# Patient Record
Sex: MALE | URBAN - METROPOLITAN AREA
[De-identification: names, ages, dates, MRNs, and addresses within clinical notes are randomized per-mention and may not be internally consistent; named-entity substitution may affect disease eponyms.]

---

## 2020-06-01 ENCOUNTER — OFFICE VISIT (OUTPATIENT)
Dept: URBAN - METROPOLITAN AREA TELEHEALTH 2 | Facility: TELEHEALTH | Age: 45
End: 2020-06-01
Payer: COMMERCIAL

## 2020-06-01 DIAGNOSIS — Z21 HIV POSITIVE: ICD-10-CM

## 2020-06-01 DIAGNOSIS — Z86.19 HISTORY OF HEPATITIS C VIRUS INFECTION: ICD-10-CM

## 2020-06-01 PROCEDURE — G8785 BP SCRN NO PERF AT INTERVAL: HCPCS | Performed by: INTERNAL MEDICINE

## 2020-06-01 PROCEDURE — G8428 CUR MEDS NOT DOCUMENT: HCPCS | Performed by: INTERNAL MEDICINE

## 2020-06-01 PROCEDURE — 99213 OFFICE O/P EST LOW 20 MIN: CPT | Performed by: INTERNAL MEDICINE

## 2020-06-01 PROCEDURE — G9903 PT SCRN TBCO ID AS NON USER: HCPCS | Performed by: INTERNAL MEDICINE

## 2020-06-01 PROCEDURE — G8417 CALC BMI ABV UP PARAM F/U: HCPCS | Performed by: INTERNAL MEDICINE

## 2020-06-01 PROCEDURE — 1036F TOBACCO NON-USER: CPT | Performed by: INTERNAL MEDICINE

## 2020-06-01 RX ORDER — BUPROPION HYDROCHLORIDE 300 MG/1
TAKE 1 TABLET (300 MG) BY ORAL ROUTE ONCE DAILY TABLET, EXTENDED RELEASE ORAL 1
Qty: 0 | Refills: 0 | Status: ACTIVE | COMMUNITY
Start: 1900-01-01

## 2020-06-01 RX ORDER — DIAZEPAM 5 MG/1
TABLET ORAL
Qty: 0 | Refills: 0 | Status: ACTIVE | COMMUNITY
Start: 1900-01-01

## 2020-06-01 RX ORDER — MIRTAZAPINE 7.5 MG/1
TABLET ORAL
Qty: 0 | Refills: 0 | Status: ACTIVE | COMMUNITY
Start: 1900-01-01

## 2020-06-01 RX ORDER — BICTEGRAVIR SODIUM, EMTRICITABINE, AND TENOFOVIR ALAFENAMIDE FUMARATE 50; 200; 25 MG/1; MG/1; MG/1
TAKE 1 TABLET BY ORAL ROUTE ONCE DAILY TABLET ORAL 1
Qty: 0 | Refills: 0 | Status: ACTIVE | COMMUNITY
Start: 1900-01-01

## 2020-06-01 RX ORDER — SILDENAFIL 100 MG/1
TAKE 1 TABLET (100 MG) BY ORAL ROUTE ONCE DAILY AS NEEDED APPROXIMATELY 1 HOUR BEFORE SEXUAL ACTIVITY TABLET, FILM COATED ORAL 1
Qty: 0 | Refills: 0 | Status: ACTIVE | COMMUNITY
Start: 1900-01-01

## 2020-06-01 NOTE — HPI-TODAY'S VISIT:
The patient returns to the office today by a virtual appointment in follow-up.  The patient completed 8 weeks of mavyret for genotype 1 chronic hepatitis-C with a starting viral load of approximately 1.4 million.  The patient's last dose will be tomorrow.  They are will be no missed doses. He had no side affects.

## 2020-06-12 ENCOUNTER — LAB OUTSIDE AN ENCOUNTER (OUTPATIENT)
Dept: URBAN - METROPOLITAN AREA CLINIC 105 | Facility: CLINIC | Age: 45
End: 2020-06-12

## 2020-06-16 LAB
A/G RATIO: 1.9
ALBUMIN: 4.4
ALKALINE PHOSPHATASE: 98
ALT (SGPT): 19
AST (SGOT): 26
BASO (ABSOLUTE): 0
BASOS: 0
BILIRUBIN, TOTAL: 0.6
BUN/CREATININE RATIO: 8
BUN: 12
CALCIUM: 9.2
CARBON DIOXIDE, TOTAL: 23
CHLORIDE: 100
CREATININE: 1.59
EGFR IF AFRICN AM: 60
EGFR IF NONAFRICN AM: 52
EOS (ABSOLUTE): 0.2
EOS: 4
GLOBULIN, TOTAL: 2.3
GLUCOSE: 103
HCV LOG10: (no result)
HEMATOCRIT: 43.1
HEMATOLOGY COMMENTS:: (no result)
HEMOGLOBIN: 14.5
HEPATITIS C QUANTITATION: (no result)
IMMATURE CELLS: (no result)
IMMATURE GRANS (ABS): 0.1
IMMATURE GRANULOCYTES: 1
LYMPHS (ABSOLUTE): 2.2
LYMPHS: 33
MCH: 32.6
MCHC: 33.6
MCV: 97
MONOCYTES(ABSOLUTE): 0.8
MONOCYTES: 12
NEUTROPHILS (ABSOLUTE): 3.3
NEUTROPHILS: 50
NRBC: (no result)
PLATELETS: 207
POTASSIUM: 4.1
PROTEIN, TOTAL: 6.7
RBC: 4.45
RDW: 13.5
SODIUM: 138
TEST INFORMATION:: (no result)
WBC: 6.6

## 2020-08-14 ENCOUNTER — OFFICE VISIT (OUTPATIENT)
Dept: URBAN - METROPOLITAN AREA CLINIC 105 | Facility: CLINIC | Age: 45
End: 2020-08-14
Payer: COMMERCIAL

## 2020-08-14 ENCOUNTER — LAB OUTSIDE AN ENCOUNTER (OUTPATIENT)
Dept: URBAN - METROPOLITAN AREA CLINIC 105 | Facility: CLINIC | Age: 45
End: 2020-08-14

## 2020-08-14 ENCOUNTER — DASHBOARD ENCOUNTERS (OUTPATIENT)
Age: 45
End: 2020-08-14

## 2020-08-14 DIAGNOSIS — F32.89 OTHER DEPRESSION: ICD-10-CM

## 2020-08-14 DIAGNOSIS — R79.89 CREATININE ELEVATION: ICD-10-CM

## 2020-08-14 DIAGNOSIS — B18.2 CHRONIC HEPATITIS C WITHOUT HEPATIC COMA: ICD-10-CM

## 2020-08-14 DIAGNOSIS — Z21 ASYMPTOMATIC HIV INFECTION: ICD-10-CM

## 2020-08-14 PROBLEM — 91947003: Status: ACTIVE | Noted: 2020-08-14

## 2020-08-14 PROBLEM — 35489007: Status: ACTIVE | Noted: 2020-08-14

## 2020-08-14 PROBLEM — 128302006: Status: ACTIVE | Noted: 2020-08-14

## 2020-08-14 PROBLEM — 365756002: Status: ACTIVE | Noted: 2020-08-14

## 2020-08-14 PROCEDURE — 1036F TOBACCO NON-USER: CPT | Performed by: INTERNAL MEDICINE

## 2020-08-14 PROCEDURE — G8427 DOCREV CUR MEDS BY ELIG CLIN: HCPCS | Performed by: INTERNAL MEDICINE

## 2020-08-14 PROCEDURE — 99213 OFFICE O/P EST LOW 20 MIN: CPT | Performed by: INTERNAL MEDICINE

## 2020-08-14 PROCEDURE — G8417 CALC BMI ABV UP PARAM F/U: HCPCS | Performed by: INTERNAL MEDICINE

## 2020-08-14 RX ORDER — DIAZEPAM 5 MG/1
TABLET ORAL
Qty: 0 | Refills: 0 | Status: ACTIVE | COMMUNITY
Start: 1900-01-01

## 2020-08-14 RX ORDER — BICTEGRAVIR SODIUM, EMTRICITABINE, AND TENOFOVIR ALAFENAMIDE FUMARATE 50; 200; 25 MG/1; MG/1; MG/1
TAKE 1 TABLET BY ORAL ROUTE ONCE DAILY TABLET ORAL 1
Qty: 0 | Refills: 0 | Status: ACTIVE | COMMUNITY
Start: 1900-01-01

## 2020-08-14 RX ORDER — SILDENAFIL 100 MG/1
TAKE 1 TABLET (100 MG) BY ORAL ROUTE ONCE DAILY AS NEEDED APPROXIMATELY 1 HOUR BEFORE SEXUAL ACTIVITY TABLET, FILM COATED ORAL 1
Qty: 0 | Refills: 0 | Status: ACTIVE | COMMUNITY
Start: 1900-01-01

## 2020-08-14 RX ORDER — MIRTAZAPINE 7.5 MG/1
TABLET ORAL
Qty: 0 | Refills: 0 | Status: ACTIVE | COMMUNITY
Start: 1900-01-01

## 2020-08-14 RX ORDER — BUPROPION HYDROCHLORIDE 300 MG/1
TAKE 1 TABLET (300 MG) BY ORAL ROUTE ONCE DAILY TABLET, EXTENDED RELEASE ORAL 1
Qty: 0 | Refills: 0 | Status: ACTIVE | COMMUNITY
Start: 1900-01-01

## 2020-08-14 NOTE — HPI-OTHER HISTORIES
46 yo male pt of DR Lu. I am seeing him as Dr Reyes is not in the office. He completed an 8 week course of Mavyret to treat chronic hep C in 6/3/20,  Labs drawn at end  of treatment showed undetectable VL and nl LFTs.  I have told him confirmation of cure would occur with labs 6 months after end of treatment which would be in 12/2020 to confirm SVR. All questions answered.  He has no complaints today.

## 2020-08-15 LAB
ALBUMIN: 4.6
ALKALINE PHOSPHATASE: 72
ALT (SGPT): 21
AST (SGOT): 36
BILIRUBIN, DIRECT: 0.17
BILIRUBIN, TOTAL: 0.6
HEP C VIRUS AB: >11
PROTEIN, TOTAL: 6.9

## 2020-08-19 LAB
Lab: (no result)
Lab: (no result)

## 2020-08-25 LAB
HCV GENOTYPE: (no result)
HCV LOG10: (no result)
HEPATITIS C QUANTITATION: (no result)
TEST INFORMATION:: (no result)
WRITTEN AUTHORIZATION: (no result)

## 2022-06-07 ENCOUNTER — WEB ENCOUNTER (OUTPATIENT)
Dept: URBAN - METROPOLITAN AREA SURGERY CENTER 16 | Facility: SURGERY CENTER | Age: 47
End: 2022-06-07

## 2022-06-09 ENCOUNTER — WEB ENCOUNTER (OUTPATIENT)
Dept: URBAN - METROPOLITAN AREA SURGERY CENTER 16 | Facility: SURGERY CENTER | Age: 47
End: 2022-06-09

## 2022-06-13 ENCOUNTER — CLAIMS CREATED FROM THE CLAIM WINDOW (OUTPATIENT)
Dept: URBAN - METROPOLITAN AREA CLINIC 4 | Facility: CLINIC | Age: 47
End: 2022-06-13
Payer: COMMERCIAL

## 2022-06-13 ENCOUNTER — OFFICE VISIT (OUTPATIENT)
Dept: URBAN - METROPOLITAN AREA SURGERY CENTER 16 | Facility: SURGERY CENTER | Age: 47
End: 2022-06-13
Payer: COMMERCIAL

## 2022-06-13 DIAGNOSIS — Z12.11 COLON CANCER SCREENING: ICD-10-CM

## 2022-06-13 DIAGNOSIS — K63.5 BENIGN COLON POLYP: ICD-10-CM

## 2022-06-13 DIAGNOSIS — K63.89 OTHER SPECIFIED DISEASES OF INTESTINE: ICD-10-CM

## 2022-06-13 PROCEDURE — 88305 TISSUE EXAM BY PATHOLOGIST: CPT | Performed by: PATHOLOGY

## 2022-06-13 PROCEDURE — 45380 COLONOSCOPY AND BIOPSY: CPT | Performed by: INTERNAL MEDICINE

## 2022-06-13 PROCEDURE — G8907 PT DOC NO EVENTS ON DISCHARG: HCPCS | Performed by: INTERNAL MEDICINE

## 2022-06-13 RX ORDER — BICTEGRAVIR SODIUM, EMTRICITABINE, AND TENOFOVIR ALAFENAMIDE FUMARATE 50; 200; 25 MG/1; MG/1; MG/1
TAKE 1 TABLET BY ORAL ROUTE ONCE DAILY TABLET ORAL 1
Qty: 0 | Refills: 0 | Status: ACTIVE | COMMUNITY
Start: 1900-01-01

## 2022-06-13 RX ORDER — MIRTAZAPINE 7.5 MG/1
TABLET ORAL
Qty: 0 | Refills: 0 | Status: ACTIVE | COMMUNITY
Start: 1900-01-01

## 2022-06-13 RX ORDER — SILDENAFIL 100 MG/1
TAKE 1 TABLET (100 MG) BY ORAL ROUTE ONCE DAILY AS NEEDED APPROXIMATELY 1 HOUR BEFORE SEXUAL ACTIVITY TABLET, FILM COATED ORAL 1
Qty: 0 | Refills: 0 | Status: ACTIVE | COMMUNITY
Start: 1900-01-01

## 2022-06-13 RX ORDER — DIAZEPAM 5 MG/1
TABLET ORAL
Qty: 0 | Refills: 0 | Status: ACTIVE | COMMUNITY
Start: 1900-01-01

## 2022-06-13 RX ORDER — BUPROPION HYDROCHLORIDE 300 MG/1
TAKE 1 TABLET (300 MG) BY ORAL ROUTE ONCE DAILY TABLET, EXTENDED RELEASE ORAL 1
Qty: 0 | Refills: 0 | Status: ACTIVE | COMMUNITY
Start: 1900-01-01